# Patient Record
Sex: MALE | Race: WHITE | NOT HISPANIC OR LATINO
[De-identification: names, ages, dates, MRNs, and addresses within clinical notes are randomized per-mention and may not be internally consistent; named-entity substitution may affect disease eponyms.]

---

## 2020-02-05 ENCOUNTER — APPOINTMENT (OUTPATIENT)
Dept: PULMONOLOGY | Facility: CLINIC | Age: 45
End: 2020-02-05
Payer: COMMERCIAL

## 2020-02-05 VITALS
OXYGEN SATURATION: 98 % | DIASTOLIC BLOOD PRESSURE: 87 MMHG | HEART RATE: 78 BPM | HEIGHT: 70 IN | SYSTOLIC BLOOD PRESSURE: 131 MMHG | BODY MASS INDEX: 34.36 KG/M2 | WEIGHT: 240 LBS | RESPIRATION RATE: 16 BRPM | TEMPERATURE: 97.7 F

## 2020-02-05 DIAGNOSIS — Z78.9 OTHER SPECIFIED HEALTH STATUS: ICD-10-CM

## 2020-02-05 PROCEDURE — 99204 OFFICE O/P NEW MOD 45 MIN: CPT

## 2020-02-05 NOTE — ASSESSMENT
[FreeTextEntry1] : This is a 45 year old male referred by Dr. Medina for evaluation of possible Inspire implantation. 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 He also has tired a UPPP and a mandibular device with no success. He continues to have multiple signs and symptoms of sleep-disordered breathing include frequent nocturnal awakenings, nonrestorative sleep, loud snoring, witnessed apnea, and EDS. He was referred to the [facility] for a HSAT. The ramifications of CHELSIE and its potential therapeutic modalities were discussed with the patient. The patient was cautioned on the importance of avoiding drowsy driving. He will follow up with us after the HSAT and will be referred to Dr. Camacho for formal evaluation if the AHI is within an appropriate range.\par \par Though his BMI is out of range this is disproportionate given his muscle build and artificially inflates his BMI. \par I have also given him information regarding mandibular advancement surgery as a possible surgical option for his CHELSIE. He plans on getting a consultation. Once his HSAT is done, he will inform me if he wants to pursue surgery or Inspire device. \par

## 2020-02-05 NOTE — HISTORY OF PRESENT ILLNESS
[Obstructive Sleep Apnea] : obstructive sleep apnea [Snoring] : snoring [Frequent Nocturnal Awakening] : frequent nocturnal awakening [Witnessed Apneas] : witnessed sleep apnea [ESS: ___] : ESS score [unfilled] [Daytime Somnolence] : daytime somnolence [Awakes Unrefreshed] : awakening unrefreshed [Unintentional Sleep While Inactive] : unintentional sleep while inactive [Awakening With Dry Mouth] : awakening with dry mouth [Awakes with Headache] : headache upon awakening [DMS] : DMS [Hypersomnolence] : hypersomnolence [FreeTextEntry1] : Mr. Gamboa is a 45 year old male with a significant pmhx of CHELSIE here for possible inspire implantation. The patient states he was diagnosed 16 years ago with severe CHELSIE. Since that time, he underwent a UPPP, use of mandibular advancement device, and CPAP with various masks and is unable to tolerate any of the treatment plans. He continues to have signs and symptoms of CHELSIE including witnessed apnea, loud snoring, frequent nocturnal awakenings, nonrestorative sleep, and EDS.\par \par He inquires about the hypoglossal nerve stimulator in addition to any other treatment modality that there may be for his uncontrolled CHELSIE.  [Unusual Sleep Behavior] : no unusual sleep behavior [DIS] : no DIS [Cataplexy] : no cataplexy [Sleep Paralysis] : no sleep paralysis [Hypnagogic Hallucinations] : no hallucinations when falling asleep [Hypnopompic Hallucinations] : no hallucinations when awakening

## 2020-02-05 NOTE — REVIEW OF SYSTEMS
[EDS: ESS=____] : daytime somnolence: ESS=[unfilled] [Snoring] : snoring [Witnessed Apneas] : witnessed apnea [A.M. Dry Mouth] : a.m. dry mouth [Nocturia] : nocturia [A.M. Headache] : headache present upon awakening [Difficulty Maintaining Sleep] : difficulty maintaining sleep [Hypersomnolence] : sleeping much more than usual [Negative] : Psychiatric [Difficulty Initiating Sleep] : no difficulty falling asleep [Lower Extremity Discomfort] : no lower extremity discomfort [Irresistible urge to move legs] : no irresistible urge to move legs because of lower extremity discomfort [Late day/ Evening symptoms] : no late day/evening symptoms [Sleep Disturbances due to LE symptoms] : ~T no sleep disturbances due to lower extremity symptoms [Unusual Sleep Behavior] : no unusual sleep behavior [Cataplexy] :  no cataplexy [Hypnogogic Hallucinations] : no hypnogogic hallucinations [Hypnopompic Hallucinations] : no hypnopompic hallucinations [Sleep Paralysis] : no sleep paralysis

## 2020-02-05 NOTE — CONSULT LETTER
[Dear  ___] : Dear  [unfilled], [Consult Letter:] : I had the pleasure of evaluating your patient, [unfilled]. [Please see my note below.] : Please see my note below. [Sincerely,] : Sincerely, [Consult Closing:] : Thank you very much for allowing me to participate in the care of this patient.  If you have any questions, please do not hesitate to contact me. [FreeTextEntry3] : Geo Luo DO, MPH\par Pulmonary, Critical Care, and Sleep Medicine\par  of Medicine\par [employer] [facility]

## 2020-02-05 NOTE — REASON FOR VISIT
[Sleep Apnea] : sleep apnea [Consultation] : a consultation visit [FreeTextEntry1] : referred by Dr. Medina

## 2020-02-05 NOTE — PHYSICAL EXAM
[General Appearance - Well Developed] : well developed [General Appearance - Well Nourished] : well nourished [Normal Appearance] : normal appearance [II] : II [Enlarged Base of the Tongue] : enlargement of the base of the tongue [Neck Cervical Mass (___cm)] : no neck mass was observed [Neck Appearance] : the appearance of the neck was normal [Heart Sounds] : normal S1 and S2 [Heart Rate And Rhythm] : heart rate was normal and rhythm regular [Apical Impulse] : the apical impulse was normal [] : no respiratory distress [Auscultation Breath Sounds / Voice Sounds] : lungs were clear to auscultation bilaterally [Respiration, Rhythm And Depth] : normal respiratory rhythm and effort [Cyanosis, Localized] : no localized cyanosis [Nail Clubbing] : no clubbing of the fingernails [No Focal Deficits] : no focal deficits [Oriented To Time, Place, And Person] : oriented to person, place, and time [Affect] : the affect was normal [Impaired Insight] : insight and judgment were intact [FreeTextEntry1] : UPPP [FreeTextEntry2] : No edema

## 2020-03-03 ENCOUNTER — APPOINTMENT (OUTPATIENT)
Dept: SLEEP CENTER | Facility: CLINIC | Age: 45
End: 2020-03-03

## 2020-08-04 ENCOUNTER — OUTPATIENT (OUTPATIENT)
Dept: OUTPATIENT SERVICES | Facility: HOSPITAL | Age: 45
LOS: 1 days | End: 2020-08-04

## 2020-08-04 VITALS
SYSTOLIC BLOOD PRESSURE: 137 MMHG | HEART RATE: 70 BPM | OXYGEN SATURATION: 97 % | RESPIRATION RATE: 14 BRPM | DIASTOLIC BLOOD PRESSURE: 97 MMHG | TEMPERATURE: 98 F | HEIGHT: 68.5 IN | WEIGHT: 240.08 LBS

## 2020-08-04 DIAGNOSIS — Z98.890 OTHER SPECIFIED POSTPROCEDURAL STATES: Chronic | ICD-10-CM

## 2020-08-04 DIAGNOSIS — Z98.1 ARTHRODESIS STATUS: Chronic | ICD-10-CM

## 2020-08-04 DIAGNOSIS — G47.30 SLEEP APNEA, UNSPECIFIED: ICD-10-CM

## 2020-08-04 DIAGNOSIS — S42.409A UNSPECIFIED FRACTURE OF LOWER END OF UNSPECIFIED HUMERUS, INITIAL ENCOUNTER FOR CLOSED FRACTURE: Chronic | ICD-10-CM

## 2020-08-04 DIAGNOSIS — G47.33 OBSTRUCTIVE SLEEP APNEA (ADULT) (PEDIATRIC): ICD-10-CM

## 2020-08-04 LAB
ANION GAP SERPL CALC-SCNC: 11 MMO/L — SIGNIFICANT CHANGE UP (ref 7–14)
BLD GP AB SCN SERPL QL: NEGATIVE — SIGNIFICANT CHANGE UP
BUN SERPL-MCNC: 10 MG/DL — SIGNIFICANT CHANGE UP (ref 7–23)
CALCIUM SERPL-MCNC: 8.8 MG/DL — SIGNIFICANT CHANGE UP (ref 8.4–10.5)
CHLORIDE SERPL-SCNC: 99 MMOL/L — SIGNIFICANT CHANGE UP (ref 98–107)
CO2 SERPL-SCNC: 27 MMOL/L — SIGNIFICANT CHANGE UP (ref 22–31)
CREAT SERPL-MCNC: 1.05 MG/DL — SIGNIFICANT CHANGE UP (ref 0.5–1.3)
GLUCOSE SERPL-MCNC: 78 MG/DL — SIGNIFICANT CHANGE UP (ref 70–99)
HCT VFR BLD CALC: 54.7 % — HIGH (ref 39–50)
HGB BLD-MCNC: 17.7 G/DL — HIGH (ref 13–17)
MCHC RBC-ENTMCNC: 32.4 % — SIGNIFICANT CHANGE UP (ref 32–36)
MCHC RBC-ENTMCNC: 32.6 PG — SIGNIFICANT CHANGE UP (ref 27–34)
MCV RBC AUTO: 100.7 FL — HIGH (ref 80–100)
NRBC # FLD: 0 K/UL — SIGNIFICANT CHANGE UP (ref 0–0)
PLATELET # BLD AUTO: 241 K/UL — SIGNIFICANT CHANGE UP (ref 150–400)
PMV BLD: 10.7 FL — SIGNIFICANT CHANGE UP (ref 7–13)
POTASSIUM SERPL-MCNC: 4.2 MMOL/L — SIGNIFICANT CHANGE UP (ref 3.5–5.3)
POTASSIUM SERPL-SCNC: 4.2 MMOL/L — SIGNIFICANT CHANGE UP (ref 3.5–5.3)
RBC # BLD: 5.43 M/UL — SIGNIFICANT CHANGE UP (ref 4.2–5.8)
RBC # FLD: 11.9 % — SIGNIFICANT CHANGE UP (ref 10.3–14.5)
RH IG SCN BLD-IMP: POSITIVE — SIGNIFICANT CHANGE UP
SODIUM SERPL-SCNC: 137 MMOL/L — SIGNIFICANT CHANGE UP (ref 135–145)
WBC # BLD: 10.12 K/UL — SIGNIFICANT CHANGE UP (ref 3.8–10.5)
WBC # FLD AUTO: 10.12 K/UL — SIGNIFICANT CHANGE UP (ref 3.8–10.5)

## 2020-08-04 RX ORDER — SODIUM CHLORIDE 9 MG/ML
1000 INJECTION, SOLUTION INTRAVENOUS
Refills: 0 | Status: DISCONTINUED | OUTPATIENT
Start: 2020-08-13 | End: 2020-08-14

## 2020-08-04 NOTE — H&P PST ADULT - NSICDXPASTSURGICALHX_GEN_ALL_CORE_FT
PAST SURGICAL HISTORY:  Elbow fracture left 1990    S/P lumbar fusion double fusion 2018    S/P lumbar laminectomy 2015    S/P UPPP (uvulopalatopharyngoplasty) 2005

## 2020-08-04 NOTE — H&P PST ADULT - NEGATIVE BREAST SYMPTOMS
no breast lump L/no nipple discharge L/no breast lump R/no breast tenderness L/no breast tenderness R/no nipple discharge R

## 2020-08-04 NOTE — H&P PST ADULT - NEGATIVE GENERAL SYMPTOMS
no fever/no polyuria/no malaise/no fatigue/no chills/no sweating/no anorexia/no polydipsia/no weight loss/no weight gain/no polyphagia

## 2020-08-04 NOTE — H&P PST ADULT - NEGATIVE CARDIOVASCULAR SYMPTOMS
no paroxysmal nocturnal dyspnea/no peripheral edema/no chest pain/no palpitations/no claudication/no dyspnea on exertion/no orthopnea

## 2020-08-04 NOTE — H&P PST ADULT - HISTORY OF PRESENT ILLNESS
46y/o male scheduled for maxillary lefort 1 osteotomy with bone graft, bilateral sagittal split osteotomies with rigid fixation, advancement genioglossus on 8/13/2020.  Pt states, "hx of sleep apnea unable to use cpap machine, s/p UPP without improvement.  Continues to snore loud, wakes up choking."

## 2020-08-04 NOTE — H&P PST ADULT - NEGATIVE GENERAL GENITOURINARY SYMPTOMS
normal urinary frequency/no flank pain R/no bladder infections/no hematuria/no flank pain L/no dysuria/no urinary hesitancy

## 2020-08-04 NOTE — H&P PST ADULT - RS GEN PE MLT RESP DETAILS PC
respirations non-labored/no wheezes/breath sounds equal/no rales/good air movement/no chest wall tenderness/clear to auscultation bilaterally/no rhonchi/no intercostal retractions

## 2020-08-04 NOTE — H&P PST ADULT - NSICDXPASTMEDICALHX_GEN_ALL_CORE_FT
PAST MEDICAL HISTORY:  Anxiety     Depression     Hyperlipidemia     Hypertension     Obstructive sleep apnea, adult

## 2020-08-04 NOTE — H&P PST ADULT - NSICDXPROBLEM_GEN_ALL_CORE_FT
PROBLEM DIAGNOSES  Problem: Sleep apnea  Assessment and Plan: Pt scheduled for maxillary lefort 1 osteotomy with bone graft, bilateral sagittal split osteotomies with rigid fixation, advancement genioglosus on 8/13/2020.  labs done results pending, ekg done.  Preop covid instructions given.  Preop teaching done, pt able to verbalize understanding.   /97 pt instructed to obtain medical eval, discussed with surgical coordinator Nichole.    OR bookiing notified of sleep apnea  meds day of surgery fluoxetine, losartan

## 2020-08-04 NOTE — H&P PST ADULT - GASTROINTESTINAL DETAILS
no masses palpable/bowel sounds normal/nontender/no guarding/no rigidity/no distention/soft/no bruit/no rebound tenderness/no organomegaly

## 2020-08-11 ENCOUNTER — APPOINTMENT (OUTPATIENT)
Dept: DISASTER EMERGENCY | Facility: CLINIC | Age: 45
End: 2020-08-11

## 2020-08-11 DIAGNOSIS — Z01.818 ENCOUNTER FOR OTHER PREPROCEDURAL EXAMINATION: ICD-10-CM

## 2020-08-12 ENCOUNTER — TRANSCRIPTION ENCOUNTER (OUTPATIENT)
Age: 45
End: 2020-08-12

## 2020-08-12 LAB — SARS-COV-2 N GENE NPH QL NAA+PROBE: NOT DETECTED

## 2020-08-12 NOTE — ASU PATIENT PROFILE, ADULT - PSH
Elbow fracture  left 1990  S/P lumbar fusion  double fusion 2018  S/P lumbar laminectomy  2015  S/P UPPP (uvulopalatopharyngoplasty)  2005

## 2020-08-13 ENCOUNTER — INPATIENT (INPATIENT)
Facility: HOSPITAL | Age: 45
LOS: 0 days | Discharge: HOME | End: 2020-08-14
Attending: DENTIST | Admitting: DENTIST

## 2020-08-13 VITALS
OXYGEN SATURATION: 96 % | WEIGHT: 240.08 LBS | HEIGHT: 68.5 IN | HEART RATE: 76 BPM | RESPIRATION RATE: 14 BRPM | TEMPERATURE: 98 F | SYSTOLIC BLOOD PRESSURE: 157 MMHG | DIASTOLIC BLOOD PRESSURE: 110 MMHG

## 2020-08-13 DIAGNOSIS — Z98.1 ARTHRODESIS STATUS: Chronic | ICD-10-CM

## 2020-08-13 DIAGNOSIS — G47.33 OBSTRUCTIVE SLEEP APNEA (ADULT) (PEDIATRIC): ICD-10-CM

## 2020-08-13 DIAGNOSIS — Z98.890 OTHER SPECIFIED POSTPROCEDURAL STATES: Chronic | ICD-10-CM

## 2020-08-13 DIAGNOSIS — S42.409A UNSPECIFIED FRACTURE OF LOWER END OF UNSPECIFIED HUMERUS, INITIAL ENCOUNTER FOR CLOSED FRACTURE: Chronic | ICD-10-CM

## 2020-08-13 LAB
BASE EXCESS BLDA CALC-SCNC: 1 MMOL/L — SIGNIFICANT CHANGE UP
BASE EXCESS BLDA CALC-SCNC: 2.4 MMOL/L — SIGNIFICANT CHANGE UP
CA-I BLDA-SCNC: 1.12 MMOL/L — LOW (ref 1.15–1.29)
CA-I BLDA-SCNC: 1.15 MMOL/L — SIGNIFICANT CHANGE UP (ref 1.15–1.29)
GLUCOSE BLDA-MCNC: 110 MG/DL — HIGH (ref 70–99)
GLUCOSE BLDA-MCNC: 143 MG/DL — HIGH (ref 70–99)
HCO3 BLDA-SCNC: 25 MMOL/L — SIGNIFICANT CHANGE UP (ref 22–26)
HCO3 BLDA-SCNC: 26 MMOL/L — SIGNIFICANT CHANGE UP (ref 22–26)
HCT VFR BLDA CALC: 47.7 % — SIGNIFICANT CHANGE UP (ref 39–51)
HCT VFR BLDA CALC: 48.9 % — SIGNIFICANT CHANGE UP (ref 39–51)
HGB BLDA-MCNC: 15.6 G/DL — SIGNIFICANT CHANGE UP (ref 13–17)
HGB BLDA-MCNC: 16 G/DL — SIGNIFICANT CHANGE UP (ref 13–17)
PCO2 BLDA: 44 MMHG — SIGNIFICANT CHANGE UP (ref 35–48)
PCO2 BLDA: 49 MMHG — HIGH (ref 35–48)
PH BLDA: 7.36 PH — SIGNIFICANT CHANGE UP (ref 7.35–7.45)
PH BLDA: 7.39 PH — SIGNIFICANT CHANGE UP (ref 7.35–7.45)
PO2 BLDA: 136 MMHG — HIGH (ref 83–108)
PO2 BLDA: 269 MMHG — HIGH (ref 83–108)
POTASSIUM BLDA-SCNC: 4.2 MMOL/L — SIGNIFICANT CHANGE UP (ref 3.4–4.5)
POTASSIUM BLDA-SCNC: 4.2 MMOL/L — SIGNIFICANT CHANGE UP (ref 3.4–4.5)
RH IG SCN BLD-IMP: POSITIVE — SIGNIFICANT CHANGE UP
SAO2 % BLDA: 98.8 % — SIGNIFICANT CHANGE UP (ref 95–99)
SAO2 % BLDA: 99.2 % — HIGH (ref 95–99)
SODIUM BLDA-SCNC: 133 MMOL/L — LOW (ref 136–146)
SODIUM BLDA-SCNC: 133 MMOL/L — LOW (ref 136–146)

## 2020-08-13 RX ORDER — KETOROLAC TROMETHAMINE 30 MG/ML
30 SYRINGE (ML) INJECTION ONCE
Refills: 0 | Status: DISCONTINUED | OUTPATIENT
Start: 2020-08-13 | End: 2020-08-13

## 2020-08-13 RX ORDER — LOSARTAN POTASSIUM 100 MG/1
100 TABLET, FILM COATED ORAL DAILY
Refills: 0 | Status: DISCONTINUED | OUTPATIENT
Start: 2020-08-13 | End: 2020-08-14

## 2020-08-13 RX ORDER — HYDROMORPHONE HYDROCHLORIDE 2 MG/ML
1 INJECTION INTRAMUSCULAR; INTRAVENOUS; SUBCUTANEOUS
Refills: 0 | Status: DISCONTINUED | OUTPATIENT
Start: 2020-08-13 | End: 2020-08-13

## 2020-08-13 RX ORDER — OXYCODONE HYDROCHLORIDE 5 MG/1
5 TABLET ORAL EVERY 4 HOURS
Refills: 0 | Status: DISCONTINUED | OUTPATIENT
Start: 2020-08-13 | End: 2020-08-14

## 2020-08-13 RX ORDER — IBUPROFEN 200 MG
600 TABLET ORAL EVERY 6 HOURS
Refills: 0 | Status: DISCONTINUED | OUTPATIENT
Start: 2020-08-13 | End: 2020-08-14

## 2020-08-13 RX ORDER — CHLORHEXIDINE GLUCONATE 213 G/1000ML
15 SOLUTION TOPICAL
Refills: 0 | Status: DISCONTINUED | OUTPATIENT
Start: 2020-08-13 | End: 2020-08-14

## 2020-08-13 RX ORDER — FLUTICASONE PROPIONATE 50 MCG
1 SPRAY, SUSPENSION NASAL ONCE
Refills: 0 | Status: DISCONTINUED | OUTPATIENT
Start: 2020-08-13 | End: 2020-08-14

## 2020-08-13 RX ORDER — ONDANSETRON 8 MG/1
4 TABLET, FILM COATED ORAL EVERY 8 HOURS
Refills: 0 | Status: DISCONTINUED | OUTPATIENT
Start: 2020-08-13 | End: 2020-08-14

## 2020-08-13 RX ORDER — ACETAMINOPHEN 500 MG
650 TABLET ORAL EVERY 6 HOURS
Refills: 0 | Status: DISCONTINUED | OUTPATIENT
Start: 2020-08-13 | End: 2020-08-14

## 2020-08-13 RX ORDER — OXYMETAZOLINE HYDROCHLORIDE 0.5 MG/ML
1 SPRAY NASAL
Refills: 0 | Status: DISCONTINUED | OUTPATIENT
Start: 2020-08-13 | End: 2020-08-14

## 2020-08-13 RX ORDER — MORPHINE SULFATE 50 MG/1
2 CAPSULE, EXTENDED RELEASE ORAL ONCE
Refills: 0 | Status: DISCONTINUED | OUTPATIENT
Start: 2020-08-13 | End: 2020-08-13

## 2020-08-13 RX ORDER — SODIUM CHLORIDE 9 MG/ML
1000 INJECTION, SOLUTION INTRAVENOUS
Refills: 0 | Status: DISCONTINUED | OUTPATIENT
Start: 2020-08-13 | End: 2020-08-14

## 2020-08-13 RX ORDER — HYDROMORPHONE HYDROCHLORIDE 2 MG/ML
0.5 INJECTION INTRAMUSCULAR; INTRAVENOUS; SUBCUTANEOUS
Refills: 0 | Status: DISCONTINUED | OUTPATIENT
Start: 2020-08-13 | End: 2020-08-14

## 2020-08-13 RX ORDER — LOSARTAN POTASSIUM 100 MG/1
1 TABLET, FILM COATED ORAL
Qty: 0 | Refills: 0 | DISCHARGE

## 2020-08-13 RX ORDER — FLUOXETINE HCL 10 MG
80 CAPSULE ORAL
Qty: 0 | Refills: 0 | DISCHARGE

## 2020-08-13 RX ORDER — OXYCODONE HYDROCHLORIDE 5 MG/1
10 TABLET ORAL EVERY 4 HOURS
Refills: 0 | Status: DISCONTINUED | OUTPATIENT
Start: 2020-08-13 | End: 2020-08-14

## 2020-08-13 RX ORDER — FLUOXETINE HCL 10 MG
80 CAPSULE ORAL DAILY
Refills: 0 | Status: DISCONTINUED | OUTPATIENT
Start: 2020-08-13 | End: 2020-08-14

## 2020-08-13 RX ORDER — SODIUM CHLORIDE 0.65 %
1 AEROSOL, SPRAY (ML) NASAL
Refills: 0 | Status: DISCONTINUED | OUTPATIENT
Start: 2020-08-13 | End: 2020-08-14

## 2020-08-13 RX ORDER — LOVASTATIN 20 MG
1 TABLET ORAL
Qty: 0 | Refills: 0 | DISCHARGE

## 2020-08-13 RX ORDER — METOCLOPRAMIDE HCL 10 MG
10 TABLET ORAL ONCE
Refills: 0 | Status: DISCONTINUED | OUTPATIENT
Start: 2020-08-13 | End: 2020-08-14

## 2020-08-13 RX ORDER — PENICILLIN G POTASSIUM 5000000 [IU]/1
2 POWDER, FOR SOLUTION INTRAMUSCULAR; INTRAPLEURAL; INTRATHECAL; INTRAVENOUS EVERY 4 HOURS
Refills: 0 | Status: DISCONTINUED | OUTPATIENT
Start: 2020-08-13 | End: 2020-08-14

## 2020-08-13 RX ADMIN — Medication 30 MILLIGRAM(S): at 16:40

## 2020-08-13 RX ADMIN — PENICILLIN G POTASSIUM 100 MILLION UNIT(S): 5000000 POWDER, FOR SOLUTION INTRAMUSCULAR; INTRAPLEURAL; INTRATHECAL; INTRAVENOUS at 17:57

## 2020-08-13 RX ADMIN — OXYCODONE HYDROCHLORIDE 10 MILLIGRAM(S): 5 TABLET ORAL at 16:40

## 2020-08-13 RX ADMIN — HYDROMORPHONE HYDROCHLORIDE 1 MILLIGRAM(S): 2 INJECTION INTRAMUSCULAR; INTRAVENOUS; SUBCUTANEOUS at 15:50

## 2020-08-13 RX ADMIN — Medication 30 MILLIGRAM(S): at 17:05

## 2020-08-13 RX ADMIN — Medication 650 MILLIGRAM(S): at 21:59

## 2020-08-13 RX ADMIN — OXYCODONE HYDROCHLORIDE 10 MILLIGRAM(S): 5 TABLET ORAL at 20:55

## 2020-08-13 RX ADMIN — HYDROMORPHONE HYDROCHLORIDE 1 MILLIGRAM(S): 2 INJECTION INTRAMUSCULAR; INTRAVENOUS; SUBCUTANEOUS at 15:30

## 2020-08-13 RX ADMIN — OXYMETAZOLINE HYDROCHLORIDE 1 SPRAY(S): 0.5 SPRAY NASAL at 17:57

## 2020-08-13 RX ADMIN — MORPHINE SULFATE 2 MILLIGRAM(S): 50 CAPSULE, EXTENDED RELEASE ORAL at 23:51

## 2020-08-13 RX ADMIN — HYDROMORPHONE HYDROCHLORIDE 1 MILLIGRAM(S): 2 INJECTION INTRAMUSCULAR; INTRAVENOUS; SUBCUTANEOUS at 16:14

## 2020-08-13 RX ADMIN — HYDROMORPHONE HYDROCHLORIDE 1 MILLIGRAM(S): 2 INJECTION INTRAMUSCULAR; INTRAVENOUS; SUBCUTANEOUS at 16:30

## 2020-08-13 RX ADMIN — Medication 600 MILLIGRAM(S): at 23:53

## 2020-08-13 RX ADMIN — MORPHINE SULFATE 2 MILLIGRAM(S): 50 CAPSULE, EXTENDED RELEASE ORAL at 23:36

## 2020-08-13 RX ADMIN — HYDROMORPHONE HYDROCHLORIDE 1 MILLIGRAM(S): 2 INJECTION INTRAMUSCULAR; INTRAVENOUS; SUBCUTANEOUS at 15:46

## 2020-08-13 RX ADMIN — HYDROMORPHONE HYDROCHLORIDE 1 MILLIGRAM(S): 2 INJECTION INTRAMUSCULAR; INTRAVENOUS; SUBCUTANEOUS at 16:00

## 2020-08-13 RX ADMIN — CHLORHEXIDINE GLUCONATE 15 MILLILITER(S): 213 SOLUTION TOPICAL at 18:01

## 2020-08-13 RX ADMIN — Medication 80 MILLIGRAM(S): at 18:02

## 2020-08-13 RX ADMIN — HYDROMORPHONE HYDROCHLORIDE 1 MILLIGRAM(S): 2 INJECTION INTRAMUSCULAR; INTRAVENOUS; SUBCUTANEOUS at 15:34

## 2020-08-13 RX ADMIN — HYDROMORPHONE HYDROCHLORIDE 1 MILLIGRAM(S): 2 INJECTION INTRAMUSCULAR; INTRAVENOUS; SUBCUTANEOUS at 15:05

## 2020-08-13 RX ADMIN — OXYCODONE HYDROCHLORIDE 10 MILLIGRAM(S): 5 TABLET ORAL at 22:08

## 2020-08-13 RX ADMIN — OXYCODONE HYDROCHLORIDE 10 MILLIGRAM(S): 5 TABLET ORAL at 17:10

## 2020-08-13 RX ADMIN — PENICILLIN G POTASSIUM 100 MILLION UNIT(S): 5000000 POWDER, FOR SOLUTION INTRAMUSCULAR; INTRAPLEURAL; INTRATHECAL; INTRAVENOUS at 23:36

## 2020-08-13 NOTE — H&P ADULT - NSHPSOCIALHISTORY_GEN_ALL_CORE
Social History:  · Marital Status	  3 children, mother breast cancer, father  68y/o blood infection, 1 brother a&w  · Occupation	retired   · Lives With	children; spouse     Substance Use History:  · Substance Use	caffeine  denies etoh and drug abuse  · Caffeine Type	coffee  · Caffeine Amount/Frequency	3-4 cups/cans per day     Alcohol Use History:  · Have you ever consumed alcohol	never     Tobacco Usage:  · Tobacco Usage: Never smoker     Passive Smoke Exposure:  · Passive Smoke Exposure	No

## 2020-08-13 NOTE — H&P ADULT - NSHPPHYSICALEXAM_GEN_ALL_CORE
Physical Exam:  · Constitutional	detailed exam  · Constitutional Details	well-developed; well-groomed; well-nourished; no distress; obese  · Eyes	detailed exam  · Eyes Details	PERRL; EOMI; conjunctiva clear  · ENMT	detailed exam  · ENMT Details	mouth  · Mouth	normal mouth and gums; moist  · Neck	detailed exam  · Neck Details	supple; no JVD  · Breasts	detailed exam  · Breasts Details	normal shape; no tenderness  · Back	detailed exam  · Back Details	normal shape; ROM intact; strength intact  · Respiratory	detailed exam  · Respiratory Details	breath sounds equal; good air movement; respirations non-labored; clear to auscultation bilaterally; no chest wall tenderness; no intercostal retractions; no rales; no rhonchi; no wheezes  · Cardiovascular	detailed exam  · Cardiovascular Details	regular rate and rhythm  no rub  no murmur  normal PMI  · Cardiovascular Details	positive S1; positive S2  · Gastrointestinal	detailed exam  · GI Normal	soft; nontender; no distention; no masses palpable; bowel sounds normal; no bruit; no rebound tenderness; no guarding; no rigidity; no organomegaly  · Genitourinary	patient refused  · Rectal	patient refused  · Extremities	detailed exam  · Extremities Details	no clubbing; no cyanosis; no pedal edema  · Vascular	detailed exam  · Radial Pulse	right normal; left normal  · DP Pulse	right normal; left normal  · Neurological	Alert & oriented; no sensory, motor or coordination deficits, normal reflexes  · Skin	detailed exam  · Skin Details	warm and dry; color normal  · Lymph Nodes	detailed exam  · Lymphatics Comments	no palpable lymph nodes  · Musculoskeletal	detailed exam  · Musculoskeletal Details	ROM intact; normal strength  · Psychiatric	detailed exam  · Psychiatric Details	normal affect; normal behavior

## 2020-08-13 NOTE — H&P ADULT - NSHPREVIEWOFSYSTEMS_GEN_ALL_CORE
Review of Systems:   · Negative General Symptoms	no fever; no chills; no sweating; no anorexia; no weight loss; no weight gain; no polyphagia; no polyuria; no polydipsia; no malaise; no fatigue  · Negative Skin Symptoms	no rash; no itching; no dryness  · Negative Breast Symptoms	no breast tenderness L; no breast tenderness R; no breast lump L; no breast lump R; no nipple discharge L; no nipple discharge R  · Ophthalmologic	negative  · ENMT	negative  · Negative Respiratory and Thorax Symptoms	no wheezing; no dyspnea; no cough; no hemoptysis; no pleuritic chest pain  · Negative Cardiovascular Symptoms	no chest pain; no palpitations; no dyspnea on exertion; no orthopnea; no paroxysmal nocturnal dyspnea; no peripheral edema; no claudication  · Negative Gastrointestinal Symptoms	no nausea; no vomiting; no diarrhea; no constipation; no abdominal pain  · Negative General Genitourinary Symptoms	no hematuria; no flank pain L; no flank pain R; no bladder infections; no dysuria; no urinary hesitancy; normal urinary frequency  · Negative Musculoskeletal Symptoms	no arthralgia; no arthritis  · Neurological	negative  · Negative Psychiatric Symptoms	no suicidal ideation; no anxiety  · Psychiatric Symptoms	depression  · Negative Hematology Symptoms	no gum bleeding; no nose bleeding; no skin lumps  · Negative Lymphatic Symptoms	no enlarged lymph nodes; no tender lymph nodes; no swelling of extremity  · Negative Endocrine Symptoms	no cold intolerance; no heat intolerance  · Allergic/Immunologic	negative

## 2020-08-13 NOTE — H&P ADULT - HISTORY OF PRESENT ILLNESS
46y/o male scheduled for maxillary lefort 1 osteotomy with bone graft, bilateral sagittal split osteotomies with rigid fixation, advancement genioglossus on 8/13/2020 by Dr. Selby.  Pt states, "hx of sleep apnea unable to use cpap machine, s/p UPP without improvement.  Continues to snore loud, wakes up choking.

## 2020-08-13 NOTE — H&P ADULT - ASSESSMENT
Assessment and Plan:   45 year old male scheduled for maxillary lefort 1 osteotomy with bone graft, bilateral sagittal split osteotomies with rigid fixation, advancement genioglosus on 8/13/2020 with Dr. Selby.

## 2020-08-14 ENCOUNTER — TRANSCRIPTION ENCOUNTER (OUTPATIENT)
Age: 45
End: 2020-08-14

## 2020-08-14 VITALS
HEART RATE: 84 BPM | DIASTOLIC BLOOD PRESSURE: 102 MMHG | SYSTOLIC BLOOD PRESSURE: 156 MMHG | TEMPERATURE: 98 F | RESPIRATION RATE: 18 BRPM | OXYGEN SATURATION: 97 %

## 2020-08-14 RX ORDER — FLUTICASONE PROPIONATE 50 MCG
1 SPRAY, SUSPENSION NASAL
Qty: 0 | Refills: 0 | DISCHARGE
Start: 2020-08-14

## 2020-08-14 RX ORDER — IBUPROFEN 200 MG
30 TABLET ORAL
Qty: 0 | Refills: 0 | DISCHARGE
Start: 2020-08-14

## 2020-08-14 RX ORDER — AMOXICILLIN 250 MG/5ML
5 SUSPENSION, RECONSTITUTED, ORAL (ML) ORAL
Qty: 0 | Refills: 0 | DISCHARGE

## 2020-08-14 RX ORDER — OXYMETAZOLINE HYDROCHLORIDE 0.5 MG/ML
1 SPRAY NASAL
Qty: 0 | Refills: 0 | DISCHARGE
Start: 2020-08-14

## 2020-08-14 RX ORDER — ACETAMINOPHEN 500 MG
20.3 TABLET ORAL
Qty: 0 | Refills: 0 | DISCHARGE
Start: 2020-08-14

## 2020-08-14 RX ORDER — CHLORHEXIDINE GLUCONATE 213 G/1000ML
15 SOLUTION TOPICAL
Qty: 0 | Refills: 0 | DISCHARGE
Start: 2020-08-14

## 2020-08-14 RX ORDER — OXYCODONE HYDROCHLORIDE 5 MG/1
5 TABLET ORAL
Qty: 0 | Refills: 0 | DISCHARGE

## 2020-08-14 RX ORDER — SODIUM CHLORIDE 0.65 %
1 AEROSOL, SPRAY (ML) NASAL
Qty: 0 | Refills: 0 | DISCHARGE
Start: 2020-08-14

## 2020-08-14 RX ADMIN — PENICILLIN G POTASSIUM 100 MILLION UNIT(S): 5000000 POWDER, FOR SOLUTION INTRAMUSCULAR; INTRAPLEURAL; INTRATHECAL; INTRAVENOUS at 03:07

## 2020-08-14 RX ADMIN — Medication 600 MILLIGRAM(S): at 05:20

## 2020-08-14 RX ADMIN — OXYCODONE HYDROCHLORIDE 10 MILLIGRAM(S): 5 TABLET ORAL at 10:36

## 2020-08-14 RX ADMIN — Medication 600 MILLIGRAM(S): at 06:23

## 2020-08-14 RX ADMIN — Medication 600 MILLIGRAM(S): at 00:23

## 2020-08-14 RX ADMIN — PENICILLIN G POTASSIUM 100 MILLION UNIT(S): 5000000 POWDER, FOR SOLUTION INTRAMUSCULAR; INTRAPLEURAL; INTRATHECAL; INTRAVENOUS at 06:30

## 2020-08-14 RX ADMIN — OXYCODONE HYDROCHLORIDE 10 MILLIGRAM(S): 5 TABLET ORAL at 07:00

## 2020-08-14 RX ADMIN — OXYCODONE HYDROCHLORIDE 10 MILLIGRAM(S): 5 TABLET ORAL at 02:23

## 2020-08-14 RX ADMIN — OXYCODONE HYDROCHLORIDE 10 MILLIGRAM(S): 5 TABLET ORAL at 06:30

## 2020-08-14 RX ADMIN — OXYMETAZOLINE HYDROCHLORIDE 1 SPRAY(S): 0.5 SPRAY NASAL at 05:20

## 2020-08-14 RX ADMIN — Medication 650 MILLIGRAM(S): at 09:26

## 2020-08-14 RX ADMIN — OXYCODONE HYDROCHLORIDE 10 MILLIGRAM(S): 5 TABLET ORAL at 02:53

## 2020-08-14 RX ADMIN — LOSARTAN POTASSIUM 100 MILLIGRAM(S): 100 TABLET, FILM COATED ORAL at 06:30

## 2020-08-14 RX ADMIN — OXYCODONE HYDROCHLORIDE 10 MILLIGRAM(S): 5 TABLET ORAL at 11:06

## 2020-08-14 RX ADMIN — CHLORHEXIDINE GLUCONATE 15 MILLILITER(S): 213 SOLUTION TOPICAL at 05:21

## 2020-08-14 RX ADMIN — Medication 650 MILLIGRAM(S): at 03:08

## 2020-08-14 RX ADMIN — Medication 650 MILLIGRAM(S): at 09:56

## 2020-08-14 RX ADMIN — Medication 650 MILLIGRAM(S): at 03:38

## 2020-08-14 NOTE — DISCHARGE NOTE NURSING/CASE MANAGEMENT/SOCIAL WORK - PATIENT PORTAL LINK FT
You can access the FollowMyHealth Patient Portal offered by [facility] by registering at the following website: [email or web address]. By joining [facility]’s FollowMyHealth portal, you will also be able to view your health information using other applications (apps) compatible with our system.

## 2020-08-14 NOTE — PROGRESS NOTE ADULT - SUBJECTIVE AND OBJECTIVE BOX
45 year old male HD2, POD1,  s/p LeFort I,BSSO,genioplasty with Dr. Selby. Patient extubated to PACU uneventfully and transferred to 8S 844 B floor bed. NORMA or nausea reported since completion of surgery but has  continuos slight hypertension. AVSS and WNL. Pain is currently well-controlled with 600mg ibuprofen q6h and 650mg acetaminophen q6h (standing). Surgical sites hemostatic. Occlusion stable and held with proper positioning via elastics with IMF screws. Patient has ambulated, independently voided, and is tolerating PO intake at this time.    Vital Signs Last 24 Hrs  T(C): 36.9 (14 Aug 2020 05:13), Max: 37.4 (14 Aug 2020 02:26)  T(F): 98.5 (14 Aug 2020 05:13), Max: 99.4 (14 Aug 2020 02:26)  HR: 99 (14 Aug 2020 05:13) (64 - 99)  BP: 157/95 (14 Aug 2020 05:13) (137/89 - 162/90)  BP(mean): 100 (13 Aug 2020 22:00) (91 - 403)  RR: 18 (14 Aug 2020 05:13) (12 - 27)  SpO2: 92% (14 Aug 2020 05:13) (90% - 99%)      13 Aug 2020 07:01  -  14 Aug 2020 06:21  --------------------------------------------------------  IN:    IV PiggyBack: 100 mL    lactated ringers.: 1625 mL    Oral Fluid: 480 mL  Total IN: 2205 mL  OUT:    Indwelling Catheter - Urethral: 665 mL    Nasoenteral Tube: 100 mL    Voided: 250 mL  Total OUT: 1015 mL  Total NET: 1190 mL    Limited Maxillofacial Exam:  Gen: NAD, cooperative, resting  EOE: Moderate bilateral mid-facial and submental edema consistent with the expected post-operative state. Jaw-bra unsoiled and in place providing compression with ice pack. No apparent ecchymosis/hematomas. No rhinorrhea or nasal discharge noted.   IOE: Maxillary/mandibular midlines uniform and coincident with facial midline. Surgical sites hemostatic with sutures c/i. No intraoral hematoma noted. +b/l maxillary vestibular edema and +anterior mandibular edema consistent with procedure. Occlusion stable/reproducible supported by intact/correctly with elastics and IMF screws.    Assessment and Plan:   · Assessment		  A/P: 45 year old male s/p LeFort 1 osteotomy, BSSO, and genioplasty  - patient is recovering uneventfully, but experiencing stable hypertension since leaving OR.  - Continue to monitor for elevated BP  - Encourage ambulation, independent voiding, and increase PO intake  - discuss discharge with attending later today 45 year old male HD2, POD1,  s/p LeFort I,BSSO,genioplasty with Dr. Selby. Patient extubated to PACU uneventfully and transferred to 8S 844 B floor bed. NORMA or nausea reported since completion of surgery but has  continuos slight hypertension. Patient conmplains of pain which is not currently well-controlled with 600mg ibuprofen q6h and 650mg acetaminophen q6h (standing). Surgical sites hemostatic. Occlusion stable and held with proper positioning via elastics with IMF screws. Patient has not yet ambulated, nor independently voided, and is tolerating PO intake at this time but has not had adequate fluid intake. Denies dysphagia.    Vital Signs Last 24 Hrs  T(C): 36.9 (14 Aug 2020 05:13), Max: 37.4 (14 Aug 2020 02:26)  T(F): 98.5 (14 Aug 2020 05:13), Max: 99.4 (14 Aug 2020 02:26)  HR: 99 (14 Aug 2020 05:13) (64 - 99)  BP: 157/95 (14 Aug 2020 05:13) (137/89 - 162/90)  BP(mean): 100 (13 Aug 2020 22:00) (91 - 403)  RR: 18 (14 Aug 2020 05:13) (12 - 27)  SpO2: 92% (14 Aug 2020 05:13) (90% - 99%)      13 Aug 2020 07:01  -  14 Aug 2020 06:21  --------------------------------------------------------  IN:    IV PiggyBack: 100 mL    lactated ringers.: 1625 mL    Oral Fluid: 480 mL  Total IN: 2205 mL  OUT:    Indwelling Catheter - Urethral: 665 mL    Nasoenteral Tube: 100 mL    Voided: 250 mL  Total OUT: 1015 mL  Total NET: 1190 mL    Limited Maxillofacial Exam:  Gen: NAD, cooperative, resting  EOE: Mild bilateral mid-facial and submental edema consistent with the expected post-operative state. Jaw-bra unsoiled and in place providing compression. No apparent ecchymosis/hematomas. No rhinorrhea or nasal discharge noted. Nasal pads present.  IOE: Maxillary/mandibular midlines uniform and coincident with facial midline. Surgical sites hemostatic with sutures c/i. No intraoral hematoma noted. +b/l maxillary vestibular edema and +anterior mandibular edema consistent with procedure. Occlusion stable/reproducible supported by intact/correctly with elastics and IMF screws.    Assessment and Plan:   · Assessment		  A/P: 45 year old male s/p LeFort 1 osteotomy, BSSO, and genioplasty  - patient is recovering uneventfully, but experiencing stable hypertension since leaving OR.  - Continue to monitor for elevated BP  - Encourage ambulation, independent voiding, and increase PO intake  - discuss d/c IV fluids with attending  - discuss discharge with attending later today

## 2020-08-14 NOTE — DISCHARGE NOTE PROVIDER - NSDCCPTREATMENT_GEN_ALL_CORE_FT
PRINCIPAL PROCEDURE  Procedure: Osteotomy of maxilla with bilateral sagittal split osteotomy of mandible  Findings and Treatment:       SECONDARY PROCEDURE  Procedure: Genioplasty  Findings and Treatment:

## 2020-08-14 NOTE — DISCHARGE NOTE PROVIDER - CARE PROVIDER_API CALL
John Selby (DMD)  Dental Medicine Otolaryngology  Head and Neck Surgery  [address] [facility], [address]  [place] [address]  Phone: [phone]  Fax: [phone]  Follow Up Time:

## 2020-08-14 NOTE — DISCHARGE NOTE PROVIDER - NSDCMRMEDTOKEN_GEN_ALL_CORE_FT
acetaminophen 650 mg/20.3 mL oral suspension: 20.3 milliliter(s) orally every 4 hours  amoxicillin 400 mg/5 mL oral liquid: 5 milliliter(s) orally every 12 hours  chlorhexidine 0.12% mucous membrane liquid: 15 milliliter(s) mucous membrane 2 times a day  FLUoxetine: 80 milligram(s) orally once a day  fluticasone 50 mcg/inh nasal spray: 1 spray(s) nasal once  ibuprofen 100 mg/5 mL oral suspension: 30 milliliter(s) orally every 6 hours  losartan 100 mg oral tablet: 1 tab(s) orally once a day  lovastatin 10 mg oral tablet: 1 tab(s) orally once a day  oxyCODONE 5 mg/5 mL oral solution: 5 milliliter(s) orally every 6 hours  oxymetazoline 0.05% nasal spray: 1 spray(s) nasal 2 times a day (before meals). STOP IN 3 DAYS  sodium chloride 0.65% nasal spray: 1 spray(s) nasal every 1 to 2 hours

## 2020-08-14 NOTE — PROGRESS NOTE ADULT - ASSESSMENT
A/P: 45 year old male s/p LeFort 1 osteotomy, BSSO, and genioplasty  - patient is recovering uneventfully, but experiencing stable hypertension since leaving OR.  - Continue to monitor for elevated BP  - Encourage ambulation, independent voiding, and increase PO intake  - discuss discharge with attending later today A/P: 45 year old male s/p LeFort 1 osteotomy, BSSO, and genioplasty  - patient is recovering uneventfully, but experiencing stable hypertension since leaving OR.  - Continue to monitor for elevated BP  - Encourage ambulation, independent voiding, and increase PO intake  - discuss d/c IV fluids with attending  - discuss discharge with attending later today

## 2020-08-14 NOTE — DISCHARGE NOTE PROVIDER - HOSPITAL COURSE
08/13/2020 - pt is a 45 year old male who presented to [facility] for LeFort I, BSSO, genioplasty with Dr. Selby. Surgery was performed without any complication. Patient was then extubated and brought to the PAC 4 hrs s/p LeFort I, BSSO, genioplasty. Patient reported or nausea. NORMA since OR, tolerating sips PO, pain was well controlled.    08/14/2020 - Pt is 45 year old male 1 day s/p LeFort I, BSSO, genioplasty. Follow-up evaluation was performed in 8S 844 B. Patient reports slight pain overnight which has since resolved, no nausea, no dysphagia. Pt became ambulatory, has independently voided, and tolerating PO intake without issue.

## 2020-08-14 NOTE — PROGRESS NOTE ADULT - SUBJECTIVE AND OBJECTIVE BOX
ANESTHESIA POSTOP CHECK    45y Male POSTOP DAY 1 S/P Leforte1    Vital Signs Last 24 Hrs  T(C): 36.6 (14 Aug 2020 10:25), Max: 37.4 (14 Aug 2020 02:26)  T(F): 97.9 (14 Aug 2020 10:25), Max: 99.4 (14 Aug 2020 02:26)  HR: 84 (14 Aug 2020 10:25) (64 - 99)  BP: 156/102 (14 Aug 2020 10:25) (137/89 - 160/108)  BP(mean): 100 (13 Aug 2020 22:00) (91 - 403)  RR: 18 (14 Aug 2020 10:25) (12 - 20)  SpO2: 97% (14 Aug 2020 10:25) (90% - 99%)  I&O's Summary    13 Aug 2020 07:01  -  14 Aug 2020 07:00  --------------------------------------------------------  IN: 2905 mL / OUT: 1265 mL / NET: 1640 mL        [X] NO APPARENT ANESTHESIA COMPLICATIONS      Comments:

## 2020-08-14 NOTE — DISCHARGE NOTE PROVIDER - NSDCACTIVITY_GEN_ALL_CORE
Bathing allowed/Do not drive or operate machinery/No heavy lifting/straining/Do not make important decisions/Walking - Outdoors allowed/Return to Work/School allowed/Stairs allowed/Walking - Indoors allowed/Showering allowed

## 2020-08-14 NOTE — DISCHARGE NOTE PROVIDER - NSDCFUADDINST_GEN_ALL_CORE_FT
diet: clear liquid  pain: ibuprofen/acetaminophen, oxycodone if pain not controlled by ibuprofen/acetaminophen  1 week follow-up appointment with Dr. Selby, call to confirm at [phone]

## 2021-08-16 ENCOUNTER — RX RENEWAL (OUTPATIENT)
Age: 46
End: 2021-08-16

## 2021-08-19 ENCOUNTER — RX RENEWAL (OUTPATIENT)
Age: 46
End: 2021-08-19

## 2021-08-19 PROBLEM — F32.9 MAJOR DEPRESSIVE DISORDER, SINGLE EPISODE, UNSPECIFIED: Chronic | Status: ACTIVE | Noted: 2020-08-04

## 2021-08-19 PROBLEM — E78.5 HYPERLIPIDEMIA, UNSPECIFIED: Chronic | Status: ACTIVE | Noted: 2020-08-04

## 2021-08-19 PROBLEM — I10 ESSENTIAL (PRIMARY) HYPERTENSION: Chronic | Status: ACTIVE | Noted: 2020-08-04

## 2021-08-19 PROBLEM — F41.9 ANXIETY DISORDER, UNSPECIFIED: Chronic | Status: ACTIVE | Noted: 2020-08-04

## 2021-08-19 PROBLEM — G47.33 OBSTRUCTIVE SLEEP APNEA (ADULT) (PEDIATRIC): Chronic | Status: ACTIVE | Noted: 2020-08-04

## 2021-09-13 ENCOUNTER — APPOINTMENT (OUTPATIENT)
Dept: FAMILY MEDICINE | Facility: CLINIC | Age: 46
End: 2021-09-13
Payer: COMMERCIAL

## 2021-09-13 VITALS
TEMPERATURE: 97 F | RESPIRATION RATE: 16 BRPM | OXYGEN SATURATION: 95 % | BODY MASS INDEX: 35.79 KG/M2 | HEIGHT: 70 IN | DIASTOLIC BLOOD PRESSURE: 70 MMHG | HEART RATE: 78 BPM | WEIGHT: 250 LBS | SYSTOLIC BLOOD PRESSURE: 100 MMHG

## 2021-09-13 PROCEDURE — 99214 OFFICE O/P EST MOD 30 MIN: CPT | Mod: 25

## 2021-09-13 RX ORDER — TAMSULOSIN HYDROCHLORIDE 0.4 MG/1
0.4 CAPSULE ORAL
Qty: 90 | Refills: 0 | Status: ACTIVE | COMMUNITY
Start: 2021-04-03

## 2021-09-13 RX ORDER — AMOXICILLIN 500 MG/1
500 TABLET, FILM COATED ORAL
Qty: 21 | Refills: 0 | Status: ACTIVE | COMMUNITY
Start: 2021-07-01

## 2021-09-13 NOTE — HISTORY OF PRESENT ILLNESS
[de-identified] : This 46-year-old gentleman with metabolic syndrome presents for medication renewal.  He is on medication for hypertension and hyperlipidemia.\par He also uses Imitrex for migraine syndrome and fluoxetine for mood disorder. [FreeTextEntry1] : Medication renewal

## 2021-09-13 NOTE — PLAN
[FreeTextEntry1] : This 46-year-old gentleman presents for medication renewal and fasting blood work\par Metabolic syndrome–history of hypertension well treated with losartan 100 mg daily.  His blood pressure today is 100/70\par Morbid obesity–BMI 35 weight 250 pounds.  He is counseled on diet and exercise.  He works out at the gym regularly\par Hyperlipidemia treated with atorvastatin 20 mg daily new.  Fasting blood work to be drawn today\par Cluster headaches–uses Imitrex as needed the medication is renewed he has not had headaches in a while\par Major depressive disorder.  Doing well on Prozac 80 mg daily.\par Fasting blood work is drawn.  He is counseled on diet and exercise

## 2021-09-15 LAB
25(OH)D3 SERPL-MCNC: 28 NG/ML
ALBUMIN SERPL ELPH-MCNC: 4.7 G/DL
ALP BLD-CCNC: 122 U/L
ALT SERPL-CCNC: 29 U/L
ANION GAP SERPL CALC-SCNC: 14 MMOL/L
AST SERPL-CCNC: 44 U/L
BASOPHILS # BLD AUTO: 0.04 K/UL
BASOPHILS NFR BLD AUTO: 0.5 %
BILIRUB SERPL-MCNC: 0.4 MG/DL
BUN SERPL-MCNC: 20 MG/DL
CALCIUM SERPL-MCNC: 9.8 MG/DL
CHLORIDE SERPL-SCNC: 100 MMOL/L
CHOLEST SERPL-MCNC: 240 MG/DL
CO2 SERPL-SCNC: 22 MMOL/L
CREAT SERPL-MCNC: 1.5 MG/DL
EOSINOPHIL # BLD AUTO: 0.25 K/UL
EOSINOPHIL NFR BLD AUTO: 3.3 %
ESTIMATED AVERAGE GLUCOSE: 103 MG/DL
GLUCOSE SERPL-MCNC: 95 MG/DL
HBA1C MFR BLD HPLC: 5.2 %
HCT VFR BLD CALC: 54.9 %
HDLC SERPL-MCNC: 60 MG/DL
HGB BLD-MCNC: 17 G/DL
IMM GRANULOCYTES NFR BLD AUTO: 0.4 %
LDLC SERPL CALC-MCNC: 153 MG/DL
LYMPHOCYTES # BLD AUTO: 1.26 K/UL
LYMPHOCYTES NFR BLD AUTO: 16.7 %
MAN DIFF?: NORMAL
MCHC RBC-ENTMCNC: 31 GM/DL
MCHC RBC-ENTMCNC: 31.3 PG
MCV RBC AUTO: 101.1 FL
MONOCYTES # BLD AUTO: 0.63 K/UL
MONOCYTES NFR BLD AUTO: 8.3 %
NEUTROPHILS # BLD AUTO: 5.35 K/UL
NEUTROPHILS NFR BLD AUTO: 70.8 %
NONHDLC SERPL-MCNC: 180 MG/DL
PLATELET # BLD AUTO: 254 K/UL
POTASSIUM SERPL-SCNC: 5 MMOL/L
PROT SERPL-MCNC: 6.7 G/DL
PSA SERPL-MCNC: 0.17 NG/ML
RBC # BLD: 5.43 M/UL
RBC # FLD: 13.2 %
SODIUM SERPL-SCNC: 136 MMOL/L
TRIGL SERPL-MCNC: 136 MG/DL
TSH SERPL-ACNC: 5.01 UIU/ML
WBC # FLD AUTO: 7.56 K/UL

## 2021-11-26 ENCOUNTER — RX RENEWAL (OUTPATIENT)
Age: 46
End: 2021-11-26

## 2021-12-13 ENCOUNTER — RX RENEWAL (OUTPATIENT)
Age: 46
End: 2021-12-13

## 2022-01-12 ENCOUNTER — APPOINTMENT (OUTPATIENT)
Dept: FAMILY MEDICINE | Facility: CLINIC | Age: 47
End: 2022-01-12
Payer: COMMERCIAL

## 2022-01-12 VITALS
OXYGEN SATURATION: 93 % | BODY MASS INDEX: 35.42 KG/M2 | HEIGHT: 70 IN | HEART RATE: 69 BPM | WEIGHT: 247.38 LBS | RESPIRATION RATE: 16 BRPM | DIASTOLIC BLOOD PRESSURE: 90 MMHG | TEMPERATURE: 97.2 F | SYSTOLIC BLOOD PRESSURE: 128 MMHG

## 2022-01-12 PROCEDURE — 99214 OFFICE O/P EST MOD 30 MIN: CPT

## 2022-01-12 NOTE — HISTORY OF PRESENT ILLNESS
[FreeTextEntry1] : Medication renewal [de-identified] : 46-year-old male presents to renew his medications.\par History of hypertension metabolic syndrome\par Medication for hyperlipidemia, hypertension, mood disorder, BPH, and migraine syndrome\par He was recently started on a second antihypertensive

## 2022-01-12 NOTE — PLAN
[FreeTextEntry1] : 46-year-old male presents for medication renewal\par Metabolic syndrome–hypertension/hyperlipidemia, controlled with losartan 100 mg daily and atorvastatin 20 mg daily.  He follows with his cardiologist and was recently started on metoprolol succinate 25 mg once a day.  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

## 2022-02-09 ENCOUNTER — APPOINTMENT (OUTPATIENT)
Dept: FAMILY MEDICINE | Facility: CLINIC | Age: 47
End: 2022-02-09
Payer: COMMERCIAL

## 2022-02-09 PROCEDURE — 36415 COLL VENOUS BLD VENIPUNCTURE: CPT

## 2022-02-17 LAB
25(OH)D3 SERPL-MCNC: 23.6 NG/ML
ALBUMIN SERPL ELPH-MCNC: 4.6 G/DL
ALP BLD-CCNC: 100 U/L
ALT SERPL-CCNC: 32 U/L
ANION GAP SERPL CALC-SCNC: 12 MMOL/L
AST SERPL-CCNC: 34 U/L
BASOPHILS # BLD AUTO: 0.03 K/UL
BASOPHILS NFR BLD AUTO: 0.4 %
BILIRUB SERPL-MCNC: 0.4 MG/DL
BUN SERPL-MCNC: 20 MG/DL
CALCIUM SERPL-MCNC: 9.3 MG/DL
CHLORIDE SERPL-SCNC: 104 MMOL/L
CHOLEST SERPL-MCNC: 168 MG/DL
CO2 SERPL-SCNC: 21 MMOL/L
CREAT SERPL-MCNC: 1.36 MG/DL
EOSINOPHIL # BLD AUTO: 0.24 K/UL
EOSINOPHIL NFR BLD AUTO: 3.5 %
ESTIMATED AVERAGE GLUCOSE: 105 MG/DL
GLUCOSE SERPL-MCNC: 87 MG/DL
HBA1C MFR BLD HPLC: 5.3 %
HCT VFR BLD CALC: 53.7 %
HDLC SERPL-MCNC: 36 MG/DL
HGB BLD-MCNC: 16.6 G/DL
IMM GRANULOCYTES NFR BLD AUTO: 0.4 %
LDLC SERPL CALC-MCNC: 113 MG/DL
LYMPHOCYTES # BLD AUTO: 1.1 K/UL
LYMPHOCYTES NFR BLD AUTO: 16.1 %
MAN DIFF?: NORMAL
MCHC RBC-ENTMCNC: 30.9 GM/DL
MCHC RBC-ENTMCNC: 31.7 PG
MCV RBC AUTO: 102.5 FL
MONOCYTES # BLD AUTO: 0.5 K/UL
MONOCYTES NFR BLD AUTO: 7.3 %
NEUTROPHILS # BLD AUTO: 4.93 K/UL
NEUTROPHILS NFR BLD AUTO: 72.3 %
NONHDLC SERPL-MCNC: 133 MG/DL
PLATELET # BLD AUTO: 283 K/UL
POTASSIUM SERPL-SCNC: 5 MMOL/L
PROT SERPL-MCNC: 6.5 G/DL
PSA SERPL-MCNC: 0.19 NG/ML
RBC # BLD: 5.24 M/UL
RBC # FLD: 13.5 %
SODIUM SERPL-SCNC: 137 MMOL/L
TRIGL SERPL-MCNC: 99 MG/DL
TSH SERPL-ACNC: 3.77 UIU/ML
WBC # FLD AUTO: 6.83 K/UL

## 2022-04-04 ENCOUNTER — RX RENEWAL (OUTPATIENT)
Age: 47
End: 2022-04-04

## 2022-05-02 ENCOUNTER — APPOINTMENT (OUTPATIENT)
Dept: FAMILY MEDICINE | Facility: CLINIC | Age: 47
End: 2022-05-02

## 2022-05-09 ENCOUNTER — RX RENEWAL (OUTPATIENT)
Age: 47
End: 2022-05-09

## 2022-05-10 ENCOUNTER — RX RENEWAL (OUTPATIENT)
Age: 47
End: 2022-05-10

## 2022-05-19 ENCOUNTER — APPOINTMENT (OUTPATIENT)
Dept: FAMILY MEDICINE | Facility: CLINIC | Age: 47
End: 2022-05-19
Payer: COMMERCIAL

## 2022-05-19 VITALS
HEART RATE: 77 BPM | OXYGEN SATURATION: 94 % | TEMPERATURE: 96.7 F | HEIGHT: 70 IN | DIASTOLIC BLOOD PRESSURE: 90 MMHG | BODY MASS INDEX: 34.36 KG/M2 | SYSTOLIC BLOOD PRESSURE: 122 MMHG | WEIGHT: 240 LBS

## 2022-05-19 PROCEDURE — 99214 OFFICE O/P EST MOD 30 MIN: CPT

## 2022-05-19 NOTE — HEALTH RISK ASSESSMENT
[No falls in past year] : Patient reported no falls in the past year [0] : 2) Feeling down, depressed, or hopeless: Not at all (0) [PHQ-2 Negative - No further assessment needed] : PHQ-2 Negative - No further assessment needed [[id number]] : 0

## 2022-05-19 NOTE — HISTORY OF PRESENT ILLNESS
[FreeTextEntry1] : medicaiton refill. bp check [de-identified] : Patient presents for routine bp check and medication refill. States he is doing well with all medications and denies complaints at this time.

## 2022-05-19 NOTE — PLAN
[FreeTextEntry1] : HTN-BP in office 122/90. BP is within range- taking metoprolol 25mg and losartan 100mg. Medications reviewed and renewed. \par \par Cluster Headaches-taking sumatriptan 100mg as needed for cluster headaches, was doing well with no problems. But had cluster headaches over the past 2 weeks. Symptoms finally stopped 1 week ago. Felt that sumatriptan wasn’t working as well as before. Spoke to patient, if cluster headaches begin again, patient can regquest another medicaiton. \par \par Hyperlipidemia-Taking atorvastatin 20mg daily. Medications reviewed and renewed. next lipid panel in 3 months\par \par Depression- pt is taking fluexetine 80mg daily. Is happy with current regimen and feels well. Denies depression, SI or HI. Medications reviewed and renewed. \par  \par followup in 3 months for fasting labs

## 2022-08-15 ENCOUNTER — RX RENEWAL (OUTPATIENT)
Age: 47
End: 2022-08-15

## 2022-09-06 ENCOUNTER — RX RENEWAL (OUTPATIENT)
Age: 47
End: 2022-09-06

## 2022-09-06 ENCOUNTER — APPOINTMENT (OUTPATIENT)
Dept: FAMILY MEDICINE | Facility: CLINIC | Age: 47
End: 2022-09-06

## 2022-09-06 VITALS
TEMPERATURE: 97.8 F | SYSTOLIC BLOOD PRESSURE: 140 MMHG | OXYGEN SATURATION: 96 % | RESPIRATION RATE: 15 BRPM | HEIGHT: 70 IN | BODY MASS INDEX: 37.94 KG/M2 | HEART RATE: 79 BPM | WEIGHT: 265 LBS | DIASTOLIC BLOOD PRESSURE: 72 MMHG

## 2022-09-06 PROCEDURE — 99214 OFFICE O/P EST MOD 30 MIN: CPT

## 2022-09-06 RX ORDER — FLUOXETINE HYDROCHLORIDE 40 MG/1
40 CAPSULE ORAL DAILY
Qty: 180 | Refills: 0 | Status: DISCONTINUED | COMMUNITY
End: 2022-09-06

## 2022-09-06 NOTE — HEALTH RISK ASSESSMENT
[No falls in past year] : Patient reported no falls in the past year [0] : 2) Feeling down, depressed, or hopeless: Not at all (0) [PHQ-2 Negative - No further assessment needed] : PHQ-2 Negative - No further assessment needed [[id number]] : 0

## 2022-09-06 NOTE — HISTORY OF PRESENT ILLNESS
[FreeTextEntry1] : med refills [de-identified] : Patient presents for routine refills of medications. He would like to decrease his dosage of fluoxetine to gradually get off medication. Compliant with all medications and denies complaints at this time.

## 2022-09-06 NOTE — PLAN
[FreeTextEntry1] : HTN-bp moderately controlled at 140/72 with losartan and metoprolol. Medications reviewed and renewed. if bp continues to increase will discuss increasing medication. \par \par Cluster HA- sumatriptan working well. Medications reviewed and renewed. \par \par Depression- as pre patient request will start to decrease dose of fluoxetine. Will decrease from 80mg to 60mg per day. \par \par f/u in 3 months.

## 2022-11-21 ENCOUNTER — APPOINTMENT (OUTPATIENT)
Dept: FAMILY MEDICINE | Facility: CLINIC | Age: 47
End: 2022-11-21

## 2022-11-21 VITALS
WEIGHT: 245 LBS | HEIGHT: 70 IN | SYSTOLIC BLOOD PRESSURE: 132 MMHG | DIASTOLIC BLOOD PRESSURE: 98 MMHG | HEART RATE: 96 BPM | BODY MASS INDEX: 35.07 KG/M2 | OXYGEN SATURATION: 98 % | TEMPERATURE: 97.4 F

## 2022-11-21 DIAGNOSIS — R50.9 FEVER, UNSPECIFIED: ICD-10-CM

## 2022-11-21 PROCEDURE — 99214 OFFICE O/P EST MOD 30 MIN: CPT | Mod: 25

## 2022-11-21 RX ORDER — IVERMECTIN 3 MG/1
3 TABLET ORAL
Qty: 54 | Refills: 0 | Status: DISCONTINUED | COMMUNITY
Start: 2021-12-15

## 2022-11-21 NOTE — PLAN
[FreeTextEntry1] : 47-year-old male presents for evaluation and blood work\par Febrile syndrome–2 bouts of night sweats fever over the period of 2 weeks\par A sed rate is drawn for evaluation and testosterone level\par Hypertension–he is on Toprol 25 mg daily and losartan 100 mg daily.  His blood pressure 132/98.  Very muscular gentleman who works out at the gym on a daily basis.  He did does not use performance-enhancing drugs\par Metabolic syndrome–5 feet 10 inches 245 pounds BMI greater than 35 diet and exercise are discussed\par Lab work is drawn for evaluation

## 2022-11-21 NOTE — HISTORY OF PRESENT ILLNESS
[FreeTextEntry1] : chills fever  [de-identified] : 2 bouts over 2 weeks \par labs \par losartan and metoprolol \par no juice \par tob- etoh -

## 2022-11-21 NOTE — HEALTH RISK ASSESSMENT
[0] : 2) Feeling down, depressed, or hopeless: Not at all (0) [PHQ-2 Negative - No further assessment needed] : PHQ-2 Negative - No further assessment needed [[id number]] : 0

## 2022-11-23 LAB
ALBUMIN SERPL ELPH-MCNC: 4.3 G/DL
ALP BLD-CCNC: 116 U/L
ALT SERPL-CCNC: 40 U/L
ANION GAP SERPL CALC-SCNC: 12 MMOL/L
AST SERPL-CCNC: 35 U/L
BASOPHILS # BLD AUTO: 0.02 K/UL
BASOPHILS NFR BLD AUTO: 0.4 %
BILIRUB SERPL-MCNC: 0.4 MG/DL
BUN SERPL-MCNC: 13 MG/DL
CALCIUM SERPL-MCNC: 9.4 MG/DL
CHLORIDE SERPL-SCNC: 103 MMOL/L
CHOLEST SERPL-MCNC: 144 MG/DL
CO2 SERPL-SCNC: 24 MMOL/L
CREAT SERPL-MCNC: 0.99 MG/DL
EGFR: 95 ML/MIN/1.73M2
EOSINOPHIL # BLD AUTO: 0.12 K/UL
EOSINOPHIL NFR BLD AUTO: 2.2 %
ERYTHROCYTE [SEDIMENTATION RATE] IN BLOOD BY WESTERGREN METHOD: 44 MM/HR
ESTIMATED AVERAGE GLUCOSE: 105 MG/DL
GLUCOSE SERPL-MCNC: 110 MG/DL
HBA1C MFR BLD HPLC: 5.3 %
HCT VFR BLD CALC: 53.3 %
HDLC SERPL-MCNC: 39 MG/DL
HGB BLD-MCNC: 17.1 G/DL
IMM GRANULOCYTES NFR BLD AUTO: 0.4 %
LDLC SERPL CALC-MCNC: 88 MG/DL
LYMPHOCYTES # BLD AUTO: 0.94 K/UL
LYMPHOCYTES NFR BLD AUTO: 17.5 %
MAN DIFF?: NORMAL
MCHC RBC-ENTMCNC: 32.1 GM/DL
MCHC RBC-ENTMCNC: 33.3 PG
MCV RBC AUTO: 103.7 FL
MONOCYTES # BLD AUTO: 0.84 K/UL
MONOCYTES NFR BLD AUTO: 15.7 %
NEUTROPHILS # BLD AUTO: 3.42 K/UL
NEUTROPHILS NFR BLD AUTO: 63.8 %
NONHDLC SERPL-MCNC: 105 MG/DL
PLATELET # BLD AUTO: 297 K/UL
POTASSIUM SERPL-SCNC: 4.3 MMOL/L
PROT SERPL-MCNC: 6.5 G/DL
PSA SERPL-MCNC: 0.26 NG/ML
RBC # BLD: 5.14 M/UL
RBC # FLD: 12.9 %
SODIUM SERPL-SCNC: 140 MMOL/L
TRIGL SERPL-MCNC: 86 MG/DL
TSH SERPL-ACNC: 3.36 UIU/ML
WBC # FLD AUTO: 5.36 K/UL

## 2022-11-30 LAB
TESTOST FREE SERPL-MCNC: 19.7 PG/ML
TESTOST SERPL-MCNC: 635 NG/DL

## 2022-12-02 ENCOUNTER — RX RENEWAL (OUTPATIENT)
Age: 47
End: 2022-12-02

## 2022-12-08 ENCOUNTER — APPOINTMENT (OUTPATIENT)
Dept: FAMILY MEDICINE | Facility: CLINIC | Age: 47
End: 2022-12-08

## 2022-12-08 VITALS
HEIGHT: 70 IN | HEART RATE: 76 BPM | RESPIRATION RATE: 15 BRPM | SYSTOLIC BLOOD PRESSURE: 110 MMHG | BODY MASS INDEX: 35.67 KG/M2 | DIASTOLIC BLOOD PRESSURE: 58 MMHG | WEIGHT: 249.19 LBS | TEMPERATURE: 97.9 F | OXYGEN SATURATION: 90 %

## 2022-12-08 PROCEDURE — 99214 OFFICE O/P EST MOD 30 MIN: CPT

## 2022-12-08 RX ORDER — SEMAGLUTIDE 1.34 MG/ML
4 INJECTION, SOLUTION SUBCUTANEOUS WEEKLY
Qty: 13 | Refills: 0 | Status: ACTIVE | COMMUNITY
Start: 2022-12-08 | End: 1900-01-01

## 2022-12-08 NOTE — PLAN
[FreeTextEntry1] : 47-year-old gentleman presents for evaluation\par Metabolic syndrome–5 foot 11 inches 283 pounds.  BMI 35.  He is very frustrated about his weight.  He is an avid exerciser goes to the gym 6 days weekly.\par He is interested in trial of Ozempic this is provided 4 mg weekly\par Hypertension–150/90.  He is aware of the effect of obesity on hypertension.\par He is on losartan 100 mg daily, and Toprol 50 mg daily\par Obstructive sleep apnea–the correlation between obstructive sleep apnea and weight is discussed.  He has seen pulmonary discussions are made of surgical intervention versus inspire treatment

## 2022-12-09 ENCOUNTER — RX CHANGE (OUTPATIENT)
Age: 47
End: 2022-12-09

## 2022-12-14 ENCOUNTER — RX CHANGE (OUTPATIENT)
Age: 47
End: 2022-12-14

## 2023-01-04 ENCOUNTER — RX RENEWAL (OUTPATIENT)
Age: 48
End: 2023-01-04

## 2023-01-09 ENCOUNTER — RX CHANGE (OUTPATIENT)
Age: 48
End: 2023-01-09

## 2023-01-09 RX ORDER — SEMAGLUTIDE 0.25 MG/.5ML
0.25 INJECTION, SOLUTION SUBCUTANEOUS WEEKLY
Qty: 2 | Refills: 0 | Status: DISCONTINUED | COMMUNITY
Start: 2023-01-04 | End: 2023-01-09

## 2023-02-22 ENCOUNTER — RX RENEWAL (OUTPATIENT)
Age: 48
End: 2023-02-22

## 2023-03-30 ENCOUNTER — APPOINTMENT (OUTPATIENT)
Dept: FAMILY MEDICINE | Facility: CLINIC | Age: 48
End: 2023-03-30
Payer: COMMERCIAL

## 2023-03-30 ENCOUNTER — RX CHANGE (OUTPATIENT)
Age: 48
End: 2023-03-30

## 2023-03-30 VITALS
HEIGHT: 70 IN | WEIGHT: 255 LBS | BODY MASS INDEX: 36.51 KG/M2 | DIASTOLIC BLOOD PRESSURE: 60 MMHG | TEMPERATURE: 97.5 F | HEART RATE: 78 BPM | OXYGEN SATURATION: 95 % | SYSTOLIC BLOOD PRESSURE: 119 MMHG | RESPIRATION RATE: 15 BRPM

## 2023-03-30 PROCEDURE — 99214 OFFICE O/P EST MOD 30 MIN: CPT

## 2023-03-30 RX ORDER — SEMAGLUTIDE 0.25 MG/.5ML
0.25 INJECTION, SOLUTION SUBCUTANEOUS
Qty: 1 | Refills: 0 | Status: ACTIVE | COMMUNITY
Start: 2023-01-09 | End: 1900-01-01

## 2023-03-30 NOTE — HISTORY OF PRESENT ILLNESS
[FreeTextEntry1] : med refills [de-identified] : Patient presents for routine med refills. He is compliant with medications, stopped taking atorvastatin 2 months ago and is taking niacin instead. Did not start wegovy due to insurance issue.

## 2023-03-30 NOTE — PLAN
[FreeTextEntry1] : HTN- bp well controlled at 119/60 with metoprolol and losartan. Medications reviewed and renewed. \par \par Migraines- sumatriptan working well when needed. Medications reviewed and renewed. \par \par \par Fasting labs at next visit- stopped atorvastatin 2months ago.

## 2023-03-31 ENCOUNTER — RX CHANGE (OUTPATIENT)
Age: 48
End: 2023-03-31

## 2023-04-03 ENCOUNTER — RX CHANGE (OUTPATIENT)
Age: 48
End: 2023-04-03

## 2023-04-06 ENCOUNTER — RX RENEWAL (OUTPATIENT)
Age: 48
End: 2023-04-06

## 2023-06-26 ENCOUNTER — RX RENEWAL (OUTPATIENT)
Age: 48
End: 2023-06-26

## 2023-07-10 ENCOUNTER — APPOINTMENT (OUTPATIENT)
Dept: FAMILY MEDICINE | Facility: CLINIC | Age: 48
End: 2023-07-10
Payer: COMMERCIAL

## 2023-07-10 VITALS
HEIGHT: 70 IN | OXYGEN SATURATION: 95 % | SYSTOLIC BLOOD PRESSURE: 130 MMHG | WEIGHT: 242 LBS | HEART RATE: 89 BPM | DIASTOLIC BLOOD PRESSURE: 98 MMHG | TEMPERATURE: 97.3 F | RESPIRATION RATE: 15 BRPM | BODY MASS INDEX: 34.65 KG/M2

## 2023-07-10 PROCEDURE — 99214 OFFICE O/P EST MOD 30 MIN: CPT

## 2023-07-10 RX ORDER — METOPROLOL SUCCINATE 25 MG/1
25 TABLET, EXTENDED RELEASE ORAL DAILY
Qty: 90 | Refills: 0 | Status: DISCONTINUED | COMMUNITY
Start: 2022-09-06 | End: 2023-07-10

## 2023-07-10 NOTE — PLAN
[FreeTextEntry1] : 48-year-old gentleman presents for evaluation\par Metabolic syndrome–hypertension 130/98 controlled with medication\par Obesity–BMI greater than 30\par Mounjaro injection started for this 5 foot 10 engine 242 pound obese male\par Testosterone deficiency–she receives injections on a weekly basis

## 2023-08-11 ENCOUNTER — APPOINTMENT (OUTPATIENT)
Dept: FAMILY MEDICINE | Facility: CLINIC | Age: 48
End: 2023-08-11
Payer: COMMERCIAL

## 2023-08-11 VITALS
HEIGHT: 70 IN | OXYGEN SATURATION: 96 % | SYSTOLIC BLOOD PRESSURE: 130 MMHG | HEART RATE: 75 BPM | DIASTOLIC BLOOD PRESSURE: 83 MMHG | TEMPERATURE: 98 F | WEIGHT: 247 LBS | BODY MASS INDEX: 35.36 KG/M2

## 2023-08-11 PROCEDURE — 99214 OFFICE O/P EST MOD 30 MIN: CPT

## 2023-09-10 NOTE — PHYSICAL EXAM
[No Acute Distress] : no acute distress [Well Nourished] : well nourished [Well Developed] : well developed [Well-Appearing] : well-appearing [Normal Sclera/Conjunctiva] : normal sclera/conjunctiva [PERRL] : pupils equal round and reactive to light [Normal Outer Ear/Nose] : the outer ears and nose were normal in appearance [EOMI] : extraocular movements intact [Normal Oropharynx] : the oropharynx was normal [No JVD] : no jugular venous distention [No Lymphadenopathy] : no lymphadenopathy [Supple] : supple [Thyroid Normal, No Nodules] : the thyroid was normal and there were no nodules present [No Respiratory Distress] : no respiratory distress  [No Accessory Muscle Use] : no accessory muscle use [Clear to Auscultation] : lungs were clear to auscultation bilaterally [Normal Rate] : normal rate  [Regular Rhythm] : with a regular rhythm [Normal S1, S2] : normal S1 and S2 [No Murmur] : no murmur heard [No Carotid Bruits] : no carotid bruits [No Abdominal Bruit] : a ~M bruit was not heard ~T in the abdomen [No Varicosities] : no varicosities [Pedal Pulses Present] : the pedal pulses are present [No Edema] : there was no peripheral edema [No Palpable Aorta] : no palpable aorta [No Extremity Clubbing/Cyanosis] : no extremity clubbing/cyanosis [Soft] : abdomen soft [Non Tender] : non-tender [Non-distended] : non-distended [No HSM] : no HSM [No Masses] : no abdominal mass palpated [Normal Bowel Sounds] : normal bowel sounds [Normal Posterior Cervical Nodes] : no posterior cervical lymphadenopathy [Normal Anterior Cervical Nodes] : no anterior cervical lymphadenopathy [No CVA Tenderness] : no CVA  tenderness [No Spinal Tenderness] : no spinal tenderness [No Joint Swelling] : no joint swelling [Grossly Normal Strength/Tone] : grossly normal strength/tone [No Rash] : no rash [Coordination Grossly Intact] : coordination grossly intact [No Focal Deficits] : no focal deficits [Normal Gait] : normal gait [Deep Tendon Reflexes (DTR)] : deep tendon reflexes were 2+ and symmetric [Normal Affect] : the affect was normal [Normal Insight/Judgement] : insight and judgment were intact

## 2023-09-10 NOTE — PLAN
[FreeTextEntry1] : 48-year-old gentleman presents for evaluation Migraine syndrome–cluster headaches with migraine component a renewal of sumatriptan as needed Obstructive sleep apnea 5 foot 10 engine 247 pounds obstructive sleep apnea on BiPAP.  O2 sat to qualify for portable oxygen O2 sat at 84% a prescription for portable oxygen is proffered Metabolic syndrome–hypertension 130/82 the importance of the control of obesity with diet and exercise to combat the diseases of metabolic syndrome

## 2023-09-10 NOTE — HEALTH RISK ASSESSMENT
[0] : 2) Feeling down, depressed, or hopeless: Not at all (0) [PHQ-2 Negative - No further assessment needed] : PHQ-2 Negative - No further assessment needed [[id number]] : 0

## 2023-10-23 ENCOUNTER — APPOINTMENT (OUTPATIENT)
Dept: FAMILY MEDICINE | Facility: CLINIC | Age: 48
End: 2023-10-23
Payer: COMMERCIAL

## 2023-10-23 VITALS
OXYGEN SATURATION: 97 % | TEMPERATURE: 97.6 F | DIASTOLIC BLOOD PRESSURE: 80 MMHG | WEIGHT: 250.5 LBS | SYSTOLIC BLOOD PRESSURE: 119 MMHG | BODY MASS INDEX: 35.86 KG/M2 | HEART RATE: 80 BPM | HEIGHT: 70 IN

## 2023-10-23 DIAGNOSIS — G44.009 CLUSTER HEADACHE SYNDROME, UNSPECIFIED, NOT INTRACTABLE: ICD-10-CM

## 2023-10-23 PROCEDURE — 99214 OFFICE O/P EST MOD 30 MIN: CPT

## 2024-01-18 ENCOUNTER — RX RENEWAL (OUTPATIENT)
Age: 49
End: 2024-01-18

## 2024-01-24 ENCOUNTER — APPOINTMENT (OUTPATIENT)
Dept: FAMILY MEDICINE | Facility: CLINIC | Age: 49
End: 2024-01-24
Payer: COMMERCIAL

## 2024-01-24 VITALS
HEIGHT: 70 IN | OXYGEN SATURATION: 95 % | WEIGHT: 233 LBS | HEART RATE: 86 BPM | DIASTOLIC BLOOD PRESSURE: 80 MMHG | SYSTOLIC BLOOD PRESSURE: 130 MMHG | BODY MASS INDEX: 33.36 KG/M2 | TEMPERATURE: 97.3 F | RESPIRATION RATE: 16 BRPM

## 2024-01-24 DIAGNOSIS — Z13.21 ENCOUNTER FOR SCREENING FOR OTHER SUSPECTED ENDOCRINE DISORDER: ICD-10-CM

## 2024-01-24 DIAGNOSIS — Z13.228 ENCOUNTER FOR SCREENING FOR OTHER SUSPECTED ENDOCRINE DISORDER: ICD-10-CM

## 2024-01-24 DIAGNOSIS — G47.33 OBSTRUCTIVE SLEEP APNEA (ADULT) (PEDIATRIC): ICD-10-CM

## 2024-01-24 DIAGNOSIS — E66.01 MORBID (SEVERE) OBESITY DUE TO EXCESS CALORIES: ICD-10-CM

## 2024-01-24 DIAGNOSIS — Z13.29 ENCOUNTER FOR SCREENING FOR OTHER SUSPECTED ENDOCRINE DISORDER: ICD-10-CM

## 2024-01-24 DIAGNOSIS — Z12.5 ENCOUNTER FOR SCREENING FOR MALIGNANT NEOPLASM OF PROSTATE: ICD-10-CM

## 2024-01-24 DIAGNOSIS — Z13.0 ENCOUNTER FOR SCREENING FOR OTHER SUSPECTED ENDOCRINE DISORDER: ICD-10-CM

## 2024-01-24 PROCEDURE — 99214 OFFICE O/P EST MOD 30 MIN: CPT

## 2024-01-24 PROCEDURE — 36415 COLL VENOUS BLD VENIPUNCTURE: CPT

## 2024-01-24 RX ORDER — ATORVASTATIN CALCIUM 20 MG/1
20 TABLET, FILM COATED ORAL
Qty: 90 | Refills: 3 | Status: DISCONTINUED | COMMUNITY
Start: 2021-11-26 | End: 2024-01-24

## 2024-01-24 NOTE — PLAN
[FreeTextEntry1] : This 49-year-old gentleman presents to reconcile medications and have lab work. Fasting metabolic panel and lipid panel are drawn for full evaluation including PSA thyroid functions and hemoglobin A1c Migraine syndrome-bouts of migraine headaches for which respond to sumatriptan 100 mg tablets as needed Cardiomyopathy-hypertension and hyperlipidemia-she is on atorvastatin that medication is not necessary to be renewed.  Losartan and metoprolol are reviewed and renewed This is a 5 foot 10 inch 233 pound male who is well aware that obesity is the major cause of the diseases of metabolic syndrome.  He is encouraged to become more active and diet more Blood pressure today 130/80 Mood disorder-fluoxetine 60 mg daily is reviewed and renewed

## 2024-02-02 LAB
ALBUMIN SERPL ELPH-MCNC: 4.4 G/DL
ALP BLD-CCNC: 120 U/L
ALT SERPL-CCNC: 30 U/L
ANION GAP SERPL CALC-SCNC: 17 MMOL/L
AST SERPL-CCNC: 31 U/L
BASOPHILS # BLD AUTO: 0.03 K/UL
BASOPHILS NFR BLD AUTO: 0.5 %
BILIRUB SERPL-MCNC: 0.4 MG/DL
BUN SERPL-MCNC: 14 MG/DL
CALCIUM SERPL-MCNC: 9.6 MG/DL
CHLORIDE SERPL-SCNC: 103 MMOL/L
CHOLEST SERPL-MCNC: 212 MG/DL
CO2 SERPL-SCNC: 22 MMOL/L
CREAT SERPL-MCNC: 1.19 MG/DL
EGFR: 75 ML/MIN/1.73M2
EOSINOPHIL # BLD AUTO: 0.25 K/UL
EOSINOPHIL NFR BLD AUTO: 3.8 %
ESTIMATED AVERAGE GLUCOSE: 94 MG/DL
GLUCOSE SERPL-MCNC: 97 MG/DL
HBA1C MFR BLD HPLC: 4.9 %
HCT VFR BLD CALC: 52.1 %
HDLC SERPL-MCNC: 50 MG/DL
HGB BLD-MCNC: 17.5 G/DL
IMM GRANULOCYTES NFR BLD AUTO: 0.2 %
LDLC SERPL CALC-MCNC: 146 MG/DL
LYMPHOCYTES # BLD AUTO: 1.21 K/UL
LYMPHOCYTES NFR BLD AUTO: 18.5 %
MAN DIFF?: NORMAL
MCHC RBC-ENTMCNC: 33 PG
MCHC RBC-ENTMCNC: 33.6 GM/DL
MCV RBC AUTO: 98.3 FL
MONOCYTES # BLD AUTO: 0.42 K/UL
MONOCYTES NFR BLD AUTO: 6.4 %
NEUTROPHILS # BLD AUTO: 4.63 K/UL
NEUTROPHILS NFR BLD AUTO: 70.6 %
NONHDLC SERPL-MCNC: 162 MG/DL
PLATELET # BLD AUTO: 261 K/UL
POTASSIUM SERPL-SCNC: 4.9 MMOL/L
PROT SERPL-MCNC: 6.5 G/DL
PSA SERPL-MCNC: 0.2 NG/ML
RBC # BLD: 5.3 M/UL
RBC # FLD: 12.4 %
SODIUM SERPL-SCNC: 142 MMOL/L
TRIGL SERPL-MCNC: 87 MG/DL
TSH SERPL-ACNC: 3.55 UIU/ML
WBC # FLD AUTO: 6.55 K/UL

## 2024-04-22 ENCOUNTER — RX RENEWAL (OUTPATIENT)
Age: 49
End: 2024-04-22

## 2024-04-25 ENCOUNTER — APPOINTMENT (OUTPATIENT)
Dept: FAMILY MEDICINE | Facility: CLINIC | Age: 49
End: 2024-04-25
Payer: COMMERCIAL

## 2024-04-25 VITALS
OXYGEN SATURATION: 96 % | BODY MASS INDEX: 36.94 KG/M2 | TEMPERATURE: 98.3 F | HEART RATE: 82 BPM | HEIGHT: 70 IN | DIASTOLIC BLOOD PRESSURE: 70 MMHG | SYSTOLIC BLOOD PRESSURE: 120 MMHG | RESPIRATION RATE: 16 BRPM | WEIGHT: 258 LBS

## 2024-04-25 DIAGNOSIS — I10 ESSENTIAL (PRIMARY) HYPERTENSION: ICD-10-CM

## 2024-04-25 DIAGNOSIS — M51.9 UNSPECIFIED THORACIC, THORACOLUMBAR AND LUMBOSACRAL INTERVERTEBRAL DISC DISORDER: ICD-10-CM

## 2024-04-25 DIAGNOSIS — F33.9 MAJOR DEPRESSIVE DISORDER, RECURRENT, UNSPECIFIED: ICD-10-CM

## 2024-04-25 DIAGNOSIS — E88.810 METABOLIC SYNDROME: ICD-10-CM

## 2024-04-25 PROCEDURE — 99214 OFFICE O/P EST MOD 30 MIN: CPT

## 2024-04-25 RX ORDER — TIRZEPATIDE 2.5 MG/.5ML
2.5 INJECTION, SOLUTION SUBCUTANEOUS
Qty: 13 | Refills: 0 | Status: ACTIVE | COMMUNITY
Start: 2023-07-10 | End: 1900-01-01

## 2024-04-25 RX ORDER — METOPROLOL SUCCINATE 50 MG/1
50 TABLET, EXTENDED RELEASE ORAL
Qty: 90 | Refills: 0 | Status: ACTIVE | COMMUNITY
Start: 2022-01-12 | End: 1900-01-01

## 2024-04-25 RX ORDER — FLUOXETINE HYDROCHLORIDE 20 MG/1
20 CAPSULE ORAL
Qty: 270 | Refills: 0 | Status: ACTIVE | COMMUNITY
Start: 2022-09-06 | End: 1900-01-01

## 2024-04-25 RX ORDER — LOSARTAN POTASSIUM 100 MG/1
100 TABLET, FILM COATED ORAL
Qty: 90 | Refills: 0 | Status: ACTIVE | COMMUNITY
Start: 2022-12-02 | End: 1900-01-01

## 2024-04-25 NOTE — HEALTH RISK ASSESSMENT
[0] : 2) Feeling down, depressed, or hopeless: Not at all (0) [PHQ-2 Negative - No further assessment needed] : PHQ-2 Negative - No further assessment needed [[id number]] : 0

## 2024-04-25 NOTE — HEALTH RISK ASSESSMENT
[0] : 2) Feeling down, depressed, or hopeless: Not at all (0) [PHQ-2 Negative - No further assessment needed] : PHQ-2 Negative - No further assessment needed [[id number]] : 0

## 2024-04-25 NOTE — PLAN
[FreeTextEntry1] : 49-year-old gentleman presents for evaluation Migraine syndrome-sumatriptan succinate 100 mg tablets as needed for cluster headache/migraine Mood disorder-fluoxetine 20 mg 3 tablets daily for total of 60 mg daily Hypertension-120/80 losartan 100 mg and metoprolol 50 mg daily Metabolic syndrome-5 foot 10 inch 255 pound morbidly obese male diet and exercise are stressed as the cornerstone of the treatment of the diseases of metabolic syndrome

## 2024-04-26 ENCOUNTER — RX RENEWAL (OUTPATIENT)
Age: 49
End: 2024-04-26

## 2024-06-03 RX ORDER — SUMATRIPTAN 100 MG/1
100 TABLET, FILM COATED ORAL
Qty: 27 | Refills: 1 | Status: ACTIVE | COMMUNITY
Start: 1900-01-01 | End: 1900-01-01

## 2024-08-26 ENCOUNTER — APPOINTMENT (OUTPATIENT)
Dept: FAMILY MEDICINE | Facility: CLINIC | Age: 49
End: 2024-08-26
Payer: COMMERCIAL

## 2024-08-26 VITALS
DIASTOLIC BLOOD PRESSURE: 100 MMHG | HEART RATE: 75 BPM | BODY MASS INDEX: 35.93 KG/M2 | WEIGHT: 251 LBS | HEIGHT: 70 IN | TEMPERATURE: 97.8 F | RESPIRATION RATE: 16 BRPM | OXYGEN SATURATION: 95 % | SYSTOLIC BLOOD PRESSURE: 160 MMHG

## 2024-08-26 DIAGNOSIS — E66.01 MORBID (SEVERE) OBESITY DUE TO EXCESS CALORIES: ICD-10-CM

## 2024-08-26 DIAGNOSIS — E88.810 METABOLIC SYNDROME: ICD-10-CM

## 2024-08-26 DIAGNOSIS — I10 ESSENTIAL (PRIMARY) HYPERTENSION: ICD-10-CM

## 2024-08-26 PROCEDURE — 99214 OFFICE O/P EST MOD 30 MIN: CPT

## 2024-08-26 NOTE — PLAN
[FreeTextEntry1] : 49-year-old gentleman presents to renew medications Cluster headaches-sumatriptan 100 mg maximum 2 tablets/day is reviewed and renewed Mood disorder-fluoxetine 60 mg daily in divided dosages Hypertension-160/100 has been off his medication for some time Losartan 100 mg daily/Toprol 50 mg daily

## 2024-08-26 NOTE — HEALTH RISK ASSESSMENT
[0] : 2) Feeling down, depressed, or hopeless: Not at all (0) [PHQ-2 Negative - No further assessment needed] : PHQ-2 Negative - No further assessment needed [[id number]] : 0

## 2024-12-19 ENCOUNTER — APPOINTMENT (OUTPATIENT)
Dept: FAMILY MEDICINE | Facility: CLINIC | Age: 49
End: 2024-12-19
Payer: COMMERCIAL

## 2024-12-19 VITALS
RESPIRATION RATE: 16 BRPM | OXYGEN SATURATION: 97 % | WEIGHT: 227 LBS | HEIGHT: 70 IN | HEART RATE: 88 BPM | SYSTOLIC BLOOD PRESSURE: 120 MMHG | TEMPERATURE: 97.9 F | DIASTOLIC BLOOD PRESSURE: 70 MMHG | BODY MASS INDEX: 32.5 KG/M2

## 2024-12-19 DIAGNOSIS — G44.009 CLUSTER HEADACHE SYNDROME, UNSPECIFIED, NOT INTRACTABLE: ICD-10-CM

## 2024-12-19 DIAGNOSIS — I10 ESSENTIAL (PRIMARY) HYPERTENSION: ICD-10-CM

## 2024-12-19 DIAGNOSIS — E88.810 METABOLIC SYNDROME: ICD-10-CM

## 2024-12-19 DIAGNOSIS — M51.9 UNSPECIFIED THORACIC, THORACOLUMBAR AND LUMBOSACRAL INTERVERTEBRAL DISC DISORDER: ICD-10-CM

## 2024-12-19 PROCEDURE — 99214 OFFICE O/P EST MOD 30 MIN: CPT

## 2025-03-25 ENCOUNTER — APPOINTMENT (OUTPATIENT)
Dept: FAMILY MEDICINE | Facility: CLINIC | Age: 50
End: 2025-03-25

## 2025-07-02 ENCOUNTER — APPOINTMENT (OUTPATIENT)
Dept: FAMILY MEDICINE | Facility: CLINIC | Age: 50
End: 2025-07-02
Payer: COMMERCIAL

## 2025-07-02 VITALS
HEART RATE: 85 BPM | BODY MASS INDEX: 33.14 KG/M2 | DIASTOLIC BLOOD PRESSURE: 80 MMHG | HEIGHT: 70 IN | TEMPERATURE: 98.6 F | WEIGHT: 231.5 LBS | SYSTOLIC BLOOD PRESSURE: 120 MMHG | RESPIRATION RATE: 16 BRPM | OXYGEN SATURATION: 98 %

## 2025-07-02 PROCEDURE — 99214 OFFICE O/P EST MOD 30 MIN: CPT
